# Patient Record
Sex: FEMALE | Race: BLACK OR AFRICAN AMERICAN | URBAN - METROPOLITAN AREA
[De-identification: names, ages, dates, MRNs, and addresses within clinical notes are randomized per-mention and may not be internally consistent; named-entity substitution may affect disease eponyms.]

---

## 2023-02-01 ENCOUNTER — OFFICE VISIT (OUTPATIENT)
Dept: URBAN - METROPOLITAN AREA CLINIC 107 | Facility: CLINIC | Age: 48
End: 2023-02-01
Payer: COMMERCIAL

## 2023-02-01 ENCOUNTER — DASHBOARD ENCOUNTERS (OUTPATIENT)
Age: 48
End: 2023-02-01

## 2023-02-01 ENCOUNTER — WEB ENCOUNTER (OUTPATIENT)
Dept: URBAN - METROPOLITAN AREA CLINIC 107 | Facility: CLINIC | Age: 48
End: 2023-02-01

## 2023-02-01 VITALS
BODY MASS INDEX: 30.65 KG/M2 | WEIGHT: 173 LBS | TEMPERATURE: 98.1 F | HEIGHT: 63 IN | SYSTOLIC BLOOD PRESSURE: 114 MMHG | HEART RATE: 84 BPM | DIASTOLIC BLOOD PRESSURE: 80 MMHG

## 2023-02-01 DIAGNOSIS — R19.4 CHANGE IN BOWEL HABITS: ICD-10-CM

## 2023-02-01 DIAGNOSIS — Z12.11 SCREENING FOR COLON CANCER: ICD-10-CM

## 2023-02-01 DIAGNOSIS — R10.9 RIGHT SIDED ABDOMINAL PAIN: ICD-10-CM

## 2023-02-01 DIAGNOSIS — R14.0 ABDOMINAL BLOATING: ICD-10-CM

## 2023-02-01 PROCEDURE — 99204 OFFICE O/P NEW MOD 45 MIN: CPT | Performed by: NURSE PRACTITIONER

## 2023-02-01 RX ORDER — POLYETHYLENE GLYCOL 3350, SODIUM CHLORIDE, SODIUM BICARBONATE, POTASSIUM CHLORIDE 420; 11.2; 5.72; 1.48 G/4L; G/4L; G/4L; G/4L
AS DIRECTED POWDER, FOR SOLUTION ORAL
Qty: 420 GRAM | Refills: 0 | OUTPATIENT
Start: 2023-02-01 | End: 2023-02-02

## 2023-02-01 NOTE — HPI-TODAY'S VISIT:
48yo female presenting for evaluation of abdominal pain and bloating. Within the last 2-3 weeks, she has experienced an exacerbation of abdominal pain, bloating, nausea and reflux symptoms. She complains of nausea which is present upon waking and persists throughout the day. Her stomach is tight and bloated following most meals. On occasion, she will experience an exacerbation of right sided abdominal pain which causes her to double over. At baseline, she has a daily nonbloody stool. Over the last few weeks, her stool frequency has lessened. She is having a small volume stool every 2-3 days. She has tried gas-x and milk of magnesia which some relief. She also complains of hot flashes. She has intermittent heartburn which she tries to relieve with drinking water. She does not have difficulty swallowing foods, but on occasion has trouble with pills. She denies unintentional weight loss. She denies NSAID, alcohol or tobacco use. There is no family history of GI malignancy. She reports normal labs with her PCP 4-5 months ago. She has never had a colonoscopy.

## 2023-02-03 ENCOUNTER — LAB OUTSIDE AN ENCOUNTER (OUTPATIENT)
Dept: URBAN - METROPOLITAN AREA CLINIC 113 | Facility: CLINIC | Age: 48
End: 2023-02-03

## 2023-02-07 ENCOUNTER — TELEPHONE ENCOUNTER (OUTPATIENT)
Dept: URBAN - METROPOLITAN AREA CLINIC 107 | Facility: CLINIC | Age: 48
End: 2023-02-07

## 2023-02-08 LAB
A/G RATIO: 1.4
ALBUMIN: 4.8
ALKALINE PHOSPHATASE: 69
ALT (SGPT): 14
AMBIG ABBREV CMP14 DEFAULT: (no result)
AST (SGOT): 25
BASO (ABSOLUTE): 0
BASOS: 1
BILIRUBIN, TOTAL: 0.4
BUN/CREATININE RATIO: 14
BUN: 14
CALCIUM: 10.3
CARBON DIOXIDE, TOTAL: 21
CHLORIDE: 102
CREATININE: 1
DEAMIDATED GLIADIN ABS, IGA: 4
DEAMIDATED GLIADIN ABS, IGG: 4
EGFR: 70
ENDOMYSIAL ANTIBODY IGA: NEGATIVE
EOS (ABSOLUTE): 0.2
EOS: 5
GLOBULIN, TOTAL: 3.5
GLUCOSE: 105
HEMATOCRIT: 44.1
HEMATOLOGY COMMENTS:: (no result)
HEMOGLOBIN: 14.7
IMMATURE CELLS: (no result)
IMMATURE GRANS (ABS): 0
IMMATURE GRANULOCYTES: 0
IMMUNOGLOBULIN A, QN, SERUM: 446
LIPASE: 31
LYMPHS (ABSOLUTE): 2.1
LYMPHS: 50
MCH: 33.2
MCHC: 33.3
MCV: 100
MONOCYTES(ABSOLUTE): 0.4
MONOCYTES: 9
NEUTROPHILS (ABSOLUTE): 1.5
NEUTROPHILS: 35
NRBC: (no result)
PLATELETS: 281
POTASSIUM: 5.1
PROTEIN, TOTAL: 8.3
RBC: 4.43
RDW: 10.6
SODIUM: 142
T-TRANSGLUTAMINASE (TTG) IGA: <2
T-TRANSGLUTAMINASE (TTG) IGG: <2
T4,FREE(DIRECT): 1.39
TSH: 3.28
WBC: 4.2

## 2023-02-09 ENCOUNTER — TELEPHONE ENCOUNTER (OUTPATIENT)
Dept: URBAN - METROPOLITAN AREA CLINIC 107 | Facility: CLINIC | Age: 48
End: 2023-02-09

## 2023-02-21 ENCOUNTER — OFFICE VISIT (OUTPATIENT)
Dept: URBAN - METROPOLITAN AREA SURGERY CENTER 25 | Facility: SURGERY CENTER | Age: 48
End: 2023-02-21
Payer: COMMERCIAL

## 2023-02-21 DIAGNOSIS — Z12.11 SCREENING FOR COLON CANCER: ICD-10-CM

## 2023-02-21 PROCEDURE — G0121 COLON CA SCRN NOT HI RSK IND: HCPCS | Performed by: STUDENT IN AN ORGANIZED HEALTH CARE EDUCATION/TRAINING PROGRAM

## 2023-02-21 PROCEDURE — G8907 PT DOC NO EVENTS ON DISCHARG: HCPCS | Performed by: STUDENT IN AN ORGANIZED HEALTH CARE EDUCATION/TRAINING PROGRAM

## 2023-06-14 ENCOUNTER — OFFICE VISIT (OUTPATIENT)
Dept: URBAN - METROPOLITAN AREA CLINIC 107 | Facility: CLINIC | Age: 48
End: 2023-06-14

## 2025-07-23 ENCOUNTER — OFFICE VISIT (OUTPATIENT)
Dept: URBAN - METROPOLITAN AREA CLINIC 107 | Facility: CLINIC | Age: 50
End: 2025-07-23
Payer: COMMERCIAL

## 2025-07-23 ENCOUNTER — WEB ENCOUNTER (OUTPATIENT)
Dept: URBAN - METROPOLITAN AREA CLINIC 107 | Facility: CLINIC | Age: 50
End: 2025-07-23

## 2025-07-23 DIAGNOSIS — K58.1 IRRITABLE BOWEL SYNDROME WITH CONSTIPATION: ICD-10-CM

## 2025-07-23 PROCEDURE — 99214 OFFICE O/P EST MOD 30 MIN: CPT | Performed by: STUDENT IN AN ORGANIZED HEALTH CARE EDUCATION/TRAINING PROGRAM

## 2025-07-23 RX ORDER — PROGESTERONE 100 MG/1
1 CAPSULE AT BEDTIME CAPSULE ORAL ONCE A DAY
Status: ACTIVE | COMMUNITY

## 2025-07-23 RX ORDER — ESTRADIOL 0.5 MG/.5G
1 PACKET TO SKIN GEL TOPICAL ONCE A DAY
Status: ACTIVE | COMMUNITY

## 2025-07-23 RX ORDER — LINACLOTIDE 145 UG/1
1 CAPSULE AT LEAST 30 MINUTES BEFORE THE FIRST MEAL OF THE DAY ON AN EMPTY STOMACH CAPSULE, GELATIN COATED ORAL ONCE A DAY
Qty: 30 | OUTPATIENT
Start: 2025-07-23 | End: 2025-08-22

## 2025-07-23 NOTE — HPI-TODAY'S VISIT:
Initial visit 2/1/23 Seen by Lilliam Almaguer NP 48yo female presenting for evaluation of abdominal pain and bloating. Within the last 2-3 weeks, she has experienced an exacerbation of abdominal pain, bloating, nausea and reflux symptoms. She complains of nausea which is present upon waking and persists throughout the day. Her stomach is tight and bloated following most meals. On occasion, she will experience an exacerbation of right sided abdominal pain which causes her to double over. At baseline, she has a daily nonbloody stool. Over the last few weeks, her stool frequency has lessened. She is having a small volume stool every 2-3 days. She has tried gas-x and milk of magnesia which some relief. She also complains of hot flashes. She has intermittent heartburn which she tries to relieve with drinking water. She does not have difficulty swallowing foods, but on occasion has trouble with pills. She denies unintentional weight loss. She denies NSAID, alcohol or tobacco use. There is no family history of GI malignancy. She reports normal labs with her PCP 4-5 months ago. She has never had a colonoscopy. . Follow-up visit 7/23/2025 Colonoscopy 2/21/2023 performed for screening: Skin tags found on perianal exam, 1 large diverticula found in the cecum, exam otherwise unremarkable.  Repeat colonoscopy recommended at a 10-year interval.  If Outside labs reviewed 7/17/2025: WC 5.1, hemoglobin 12.3, platelet 302, BUN 14, creatinine 0.5, hemoglobin A1c 5.3%, liver enzymes unremarkable She states that her bloating persists and is associated with pebble like stools, she feels better if she is able to have a bowel movement. She tried IBGARD with minimal positive response.

## 2025-07-24 ENCOUNTER — TELEPHONE ENCOUNTER (OUTPATIENT)
Dept: URBAN - METROPOLITAN AREA CLINIC 113 | Facility: CLINIC | Age: 50
End: 2025-07-24

## 2025-07-24 ENCOUNTER — P2P PATIENT RECORD (OUTPATIENT)
Age: 50
End: 2025-07-24

## 2025-08-01 ENCOUNTER — TELEPHONE ENCOUNTER (OUTPATIENT)
Dept: URBAN - METROPOLITAN AREA CLINIC 107 | Facility: CLINIC | Age: 50
End: 2025-08-01

## 2025-08-01 RX ORDER — LINACLOTIDE 290 UG/1
1 CAPSULE AT LEAST 30 MINUTES BEFORE THE FIRST MEAL OF THE DAY ON AN EMPTY STOMACH CAPSULE, GELATIN COATED ORAL ONCE A DAY
Qty: 90 | Refills: 1
Start: 2025-07-23 | End: 2026-01-31